# Patient Record
Sex: MALE | Race: WHITE | NOT HISPANIC OR LATINO | Employment: FULL TIME | URBAN - METROPOLITAN AREA
[De-identification: names, ages, dates, MRNs, and addresses within clinical notes are randomized per-mention and may not be internally consistent; named-entity substitution may affect disease eponyms.]

---

## 2024-06-02 ENCOUNTER — HOSPITAL ENCOUNTER (EMERGENCY)
Facility: HOSPITAL | Age: 54
Discharge: HOME | End: 2024-06-02
Attending: STUDENT IN AN ORGANIZED HEALTH CARE EDUCATION/TRAINING PROGRAM | Admitting: STUDENT IN AN ORGANIZED HEALTH CARE EDUCATION/TRAINING PROGRAM
Payer: OTHER GOVERNMENT

## 2024-06-02 VITALS
TEMPERATURE: 97.5 F | BODY MASS INDEX: 27.59 KG/M2 | HEIGHT: 76 IN | RESPIRATION RATE: 18 BRPM | HEART RATE: 61 BPM | OXYGEN SATURATION: 98 % | SYSTOLIC BLOOD PRESSURE: 142 MMHG | WEIGHT: 226.6 LBS | DIASTOLIC BLOOD PRESSURE: 86 MMHG

## 2024-06-02 DIAGNOSIS — S01.01XA LACERATION OF SCALP WITHOUT FOREIGN BODY, INITIAL ENCOUNTER: ICD-10-CM

## 2024-06-02 DIAGNOSIS — S01.311A EAR LOBE LACERATION, RIGHT, INITIAL ENCOUNTER: ICD-10-CM

## 2024-06-02 PROCEDURE — 99283 EMERGENCY DEPT VISIT LOW MDM: CPT | Mod: 25

## 2024-06-02 PROCEDURE — 12002 RPR S/N/AX/GEN/TRNK2.6-7.5CM: CPT

## 2024-06-02 PROCEDURE — 250N000013 HC RX MED GY IP 250 OP 250 PS 637: Performed by: STUDENT IN AN ORGANIZED HEALTH CARE EDUCATION/TRAINING PROGRAM

## 2024-06-02 PROCEDURE — 12013 RPR F/E/E/N/L/M 2.6-5.0 CM: CPT | Mod: XS

## 2024-06-02 RX ORDER — CLINDAMYCIN HCL 150 MG
300 CAPSULE ORAL ONCE
Status: COMPLETED | OUTPATIENT
Start: 2024-06-02 | End: 2024-06-02

## 2024-06-02 RX ORDER — CLINDAMYCIN HCL 300 MG
300 CAPSULE ORAL 3 TIMES DAILY
Qty: 21 CAPSULE | Refills: 0 | Status: SHIPPED | OUTPATIENT
Start: 2024-06-02 | End: 2024-06-09

## 2024-06-02 RX ADMIN — CLINDAMYCIN HYDROCHLORIDE 300 MG: 150 CAPSULE ORAL at 03:23

## 2024-06-02 ASSESSMENT — COLUMBIA-SUICIDE SEVERITY RATING SCALE - C-SSRS
2. HAVE YOU ACTUALLY HAD ANY THOUGHTS OF KILLING YOURSELF IN THE PAST MONTH?: NO
6. HAVE YOU EVER DONE ANYTHING, STARTED TO DO ANYTHING, OR PREPARED TO DO ANYTHING TO END YOUR LIFE?: NO
1. IN THE PAST MONTH, HAVE YOU WISHED YOU WERE DEAD OR WISHED YOU COULD GO TO SLEEP AND NOT WAKE UP?: NO

## 2024-06-02 ASSESSMENT — ACTIVITIES OF DAILY LIVING (ADL)
ADLS_ACUITY_SCORE: 33
ADLS_ACUITY_SCORE: 35

## 2024-06-02 NOTE — DISCHARGE INSTRUCTIONS
Please wash your head laceration with staples at least twice daily with warm water and soap.    As far as her ear laceration, try to avoid a lot of water on it, if you get a little bit of water on it while showering that is okay.  I would use topical bacitracin/Neosporin/triple antibiotic ointment on it over the next few days.    Leave the dressing on that we placed tonight for at least 24 hours to help with pressure on the ear.    As we discussed, your lacerations are at risk for infection and will need close follow-up.  Please either follow-up with your primary care doctor or an ENT clinic once you get back to [place].    Please take 500mg of tylenol every 4 hours as well as 600mg of ibuprofen every 6 hours for pain. Do not take more than 4,000mg of tylenol in 24hrs.

## 2024-06-02 NOTE — ED PROVIDER NOTES
Emergency Department Encounter         FINAL IMPRESSION:  Head laceration, ear laceration          ED COURSE AND MEDICAL DECISION MAKING       ED Course as of 06/02/24 0349   Sun Jun 02, 2024   0207 Patient is a 54-year-old healthy here with a right-sided head injury.  Patient states he was letting his dog out at a hotel when he was pulled forward and injured his head on the door frame.  Has a laceration to the right lateral head as well as laceration to his superior aspect of his ear.  No LOC.  Will repair.     -Please see procedure note.  Discussed with family that your lacerations are complex and anytime cartilage is involved there is a high risk of infection, and complex healing process.  Will have him follow-up closely once to get back to [place] this week.                     Medical Decision Making  Obtained supplemental history:Supplemental history obtained?: Family Member/Significant Other  Reviewed external records: External records reviewed?: No  Care impacted by chronic illness:N/A  Care significantly affected by social determinants of health:Access to Medical Care  Did you consider but not order tests?: Work up considered but not performed and documented in chart, if applicable  Did you interpret images independently?: Independent interpretation of ECG and images noted in documentation, when applicable.  Consultation discussion with other provider:Did you involve another provider (consultant, , pharmacy, etc.)?: No  Discharge. No recommendations on prescription strength medication(s). See documentation for any additional details.                  Critical Care     Performed by: Benjamin Cartwright or    Authorized by: Benjamin Cartwright  Total critical care time:  minutes  Critical care was necessary to treat or prevent imminent or life-threatening deterioration of the following conditions:   Critical care was time spent personally by me on the following activities: development of treatment plan with patient or  "surrogate, discussions with consultants, examination of patient, evaluation of patient's response to treatment, obtaining history from patient or surrogate, ordering and performing treatments and interventions, ordering and review of laboratory studies, ordering and review of radiographic studies, re-evaluation of patient's condition and monitoring for potential decompensation.  Critical care time was exclusive of separately billable procedures and treating other patients.'    At the conclusion of the encounter I discussed the results of all the tests and the disposition. The questions were answered. The patient or family acknowledged understanding and was agreeable with the care plan.        MEDICATIONS GIVEN IN THE EMERGENCY DEPARTMENT:  Medications   clindamycin (CLEOCIN) capsule 300 mg (300 mg Oral $Given 6/2/24 0369)       NEW PRESCRIPTIONS STARTED AT TODAY'S ED VISIT:  New Prescriptions    CLINDAMYCIN (CLEOCIN) 300 MG CAPSULE    Take 1 capsule (300 mg) by mouth 3 times daily for 7 days       HPI     Patient information obtained from: The patient and his wife    Use of : N/A    Napoleon Jeffers is a 54 year old male with no pertinent history who presents to this ED by private vehicle for evaluation of laceration.     Prior to arrival, the patient was letting his dog out at the hotel when his dog pulled him forward into the door. He hit his head on the door frame. He has a laceration on the right side of his head and on his right ear. He did not lose consciousness.      MEDICAL HISTORY     No past medical history on file.    No past surgical history on file.         clindamycin (CLEOCIN) 300 MG capsule            PHYSICAL EXAM     BP (!) 154/87   Pulse 60   Temp 97.5  F (36.4  C) (Oral)   Resp 18   Ht 1.93 m (6' 4\")   Wt 102.8 kg (226 lb 9.6 oz)   SpO2 96%   BMI 27.58 kg/m        PHYSICAL EXAM:     General: Patient appears well, nontoxic, comfortable  HEENT: Moist mucous membranes, Has a laceration " to the right lateral head as well as laceration to his superior aspect of his ear  With exposure of cartilage  Cardiovascular: Normal rate, normal rhythm, no extremity edema.  No appreciable murmur.  Respiratory: No signs of respiratory distress, lungs are clear to auscultation bilaterally with no wheezes rhonchi or rales.  Abdominal: Soft, nontender, nondistended, no palpable masses, no guarding, no rebound  Musculoskeletal: Full range of motion of joints, no deformities appreciated.  Neurological: Alert and oriented, grossly neurologically intact.  Psychological: Normal affect and mood.  Integument: No rashes appreciated          RESULTS       Labs Ordered and Resulted from Time of ED Arrival to Time of ED Departure - No data to display    No orders to display         PROCEDURES:  Procedures:  [facility]    -Laceration Repair    Date/Time: 6/2/2024 2:45 AM    Performed by: Benjamin Cartwright DO  Authorized by: Benjamin Cartwright DO    Risks, benefits and alternatives discussed.      ANESTHESIA (see MAR for exact dosages):     Anesthesia method:  Local infiltration    Local anesthetic:  Lidocaine 1% w/o epi  LACERATION DETAILS     Location:  Ear    Ear location:  R ear    Length (cm):  3    REPAIR TYPE:     Repair type:  Simple      TREATMENT:     Area cleansed with:  Saline    SKIN REPAIR     Repair method:  Sutures    Suture size:  5-0    Suture material:  Fast-absorbing gut    Suture technique:  Simple interrupted    Number of sutures:  4    APPROXIMATION     Approximation:  Close    POST-PROCEDURE DETAILS     Dressing:  Bulky dressing      PROCEDURE    Patient Tolerance:  Patient tolerated the procedure well with no immediate complications  [facility]    -Laceration Repair    Date/Time: 6/2/2024 2:45 AM    Performed by: Benjamin Cartwright DO  Authorized by: Benjamin Cartwright DO    Risks, benefits and alternatives discussed.      ANESTHESIA (see MAR for exact  dosages):     Anesthesia method:  Local infiltration  LACERATION DETAILS     Location:  Ear    Ear location:  R ear    Length (cm):  3    REPAIR TYPE:     Repair type:  Complex      TREATMENT:     Area cleansed with:  Saline    Amount of cleaning:  Standard    Irrigation solution:  Sterile water    SKIN REPAIR     Repair method:  Sutures    Suture size:  5-0    Suture technique:  Simple interrupted    Number of sutures:  5    POST-PROCEDURE DETAILS     Dressing:  Bulky dressing      PROCEDURE    Patient Tolerance:  Patient tolerated the procedure well with no immediate complications  [facility]    -Laceration Repair    Date/Time: 6/2/2024 2:33 AM    Performed by: Benjamin Cartwright DO  Authorized by: Benjamin Cartwright DO    Risks, benefits and alternatives discussed.      ANESTHESIA (see MAR for exact dosages):     Anesthesia method:  Local infiltration    Local anesthetic:  Lidocaine 1% WITH epi  LACERATION DETAILS     Location:  Scalp    Length (cm):  6    REPAIR TYPE:     Repair type:  Simple    EXPLORATION:     Wound exploration: wound explored through full range of motion and entire depth of wound probed and visualized      TREATMENT:     Area cleansed with:  Saline    Amount of cleaning:  Standard    Irrigation method:  Syringe    SKIN REPAIR     Repair method:  Staples    Number of staples:  4    APPROXIMATION     Approximation:  Close    POST-PROCEDURE DETAILS     Dressing:  Open (no dressing)      PROCEDURE    Patient Tolerance:  Patient tolerated the procedure well with no immediate complications         Darline BARRERA am serving as a scribe to document services personally performed by Benjamin Cartwright DO, based on my observations and the provider's statements to me.  I, Benjamin Cartwright DO, attest that Darline Moore is acting in a scribe capacity, has observed my performance of the services and has documented them in accordance with my direction.    Benjamin Cartwright DO  Emergency Medicine    [facility] EMERGENCY DEPARTMENT       Ohl, Benjamin Brantley DO  06/03/24 0032

## 2024-06-02 NOTE — ED TRIAGE NOTES
Pt arrives with wife. Pt reports he was taking his dog out at a hotel and hit his head on the door frame. Pt has a lac on the right side of his head and on his right ear. Denies falling or LOC.